# Patient Record
Sex: MALE | Race: BLACK OR AFRICAN AMERICAN | NOT HISPANIC OR LATINO | Employment: UNEMPLOYED | URBAN - METROPOLITAN AREA
[De-identification: names, ages, dates, MRNs, and addresses within clinical notes are randomized per-mention and may not be internally consistent; named-entity substitution may affect disease eponyms.]

---

## 2024-03-16 ENCOUNTER — HOSPITAL ENCOUNTER (EMERGENCY)
Facility: HOSPITAL | Age: 24
Discharge: HOME | End: 2024-03-16
Attending: STUDENT IN AN ORGANIZED HEALTH CARE EDUCATION/TRAINING PROGRAM
Payer: MEDICAID

## 2024-03-16 VITALS
SYSTOLIC BLOOD PRESSURE: 142 MMHG | DIASTOLIC BLOOD PRESSURE: 74 MMHG | HEIGHT: 68 IN | RESPIRATION RATE: 16 BRPM | WEIGHT: 285.69 LBS | BODY MASS INDEX: 43.3 KG/M2 | HEART RATE: 72 BPM | OXYGEN SATURATION: 99 % | TEMPERATURE: 99 F

## 2024-03-16 DIAGNOSIS — J45.901 EXACERBATION OF ASTHMA, UNSPECIFIED ASTHMA SEVERITY, UNSPECIFIED WHETHER PERSISTENT: Primary | ICD-10-CM

## 2024-03-16 DIAGNOSIS — J06.9 UPPER RESPIRATORY TRACT INFECTION, UNSPECIFIED TYPE: ICD-10-CM

## 2024-03-16 DIAGNOSIS — R07.9 CHEST PAIN: ICD-10-CM

## 2024-03-16 LAB
ALBUMIN SERPL-MCNC: 3.9 G/DL (ref 3.5–5)
ALBUMIN/GLOB SERPL: 1 RATIO (ref 1.1–2)
ALP SERPL-CCNC: 35 UNIT/L (ref 40–150)
ALT SERPL-CCNC: 35 UNIT/L (ref 0–55)
AST SERPL-CCNC: 28 UNIT/L (ref 5–34)
BASOPHILS # BLD AUTO: 0.04 X10(3)/MCL
BASOPHILS NFR BLD AUTO: 0.8 %
BILIRUB SERPL-MCNC: 1 MG/DL
BUN SERPL-MCNC: 10.9 MG/DL (ref 8.9–20.6)
CALCIUM SERPL-MCNC: 9 MG/DL (ref 8.4–10.2)
CHLORIDE SERPL-SCNC: 104 MMOL/L (ref 98–107)
CO2 SERPL-SCNC: 27 MMOL/L (ref 22–29)
CREAT SERPL-MCNC: 1.11 MG/DL (ref 0.73–1.18)
EOSINOPHIL # BLD AUTO: 0.09 X10(3)/MCL (ref 0–0.9)
EOSINOPHIL NFR BLD AUTO: 1.7 %
ERYTHROCYTE [DISTWIDTH] IN BLOOD BY AUTOMATED COUNT: 12.8 % (ref 11.5–17)
FLUAV AG UPPER RESP QL IA.RAPID: NOT DETECTED
FLUBV AG UPPER RESP QL IA.RAPID: NOT DETECTED
GFR SERPLBLD CREATININE-BSD FMLA CKD-EPI: >60 MLS/MIN/1.73/M2
GLOBULIN SER-MCNC: 4.1 GM/DL (ref 2.4–3.5)
GLUCOSE SERPL-MCNC: 100 MG/DL (ref 74–100)
HCT VFR BLD AUTO: 47.7 % (ref 42–52)
HGB BLD-MCNC: 16 G/DL (ref 14–18)
IMM GRANULOCYTES # BLD AUTO: 0.02 X10(3)/MCL (ref 0–0.04)
IMM GRANULOCYTES NFR BLD AUTO: 0.4 %
LYMPHOCYTES # BLD AUTO: 2.37 X10(3)/MCL (ref 0.6–4.6)
LYMPHOCYTES NFR BLD AUTO: 45.3 %
MCH RBC QN AUTO: 28.5 PG (ref 27–31)
MCHC RBC AUTO-ENTMCNC: 33.5 G/DL (ref 33–36)
MCV RBC AUTO: 85 FL (ref 80–94)
MONOCYTES # BLD AUTO: 0.6 X10(3)/MCL (ref 0.1–1.3)
MONOCYTES NFR BLD AUTO: 11.5 %
NEUTROPHILS # BLD AUTO: 2.11 X10(3)/MCL (ref 2.1–9.2)
NEUTROPHILS NFR BLD AUTO: 40.3 %
NRBC BLD AUTO-RTO: 0 %
OHS QRS DURATION: 74 MS
OHS QTC CALCULATION: 415 MS
PLATELET # BLD AUTO: 187 X10(3)/MCL (ref 130–400)
PMV BLD AUTO: 12 FL (ref 7.4–10.4)
POTASSIUM SERPL-SCNC: 4 MMOL/L (ref 3.5–5.1)
PROT SERPL-MCNC: 8 GM/DL (ref 6.4–8.3)
RBC # BLD AUTO: 5.61 X10(6)/MCL (ref 4.7–6.1)
SARS-COV-2 RNA RESP QL NAA+PROBE: NOT DETECTED
SODIUM SERPL-SCNC: 139 MMOL/L (ref 136–145)
TROPONIN I SERPL-MCNC: <0.01 NG/ML (ref 0–0.04)
WBC # SPEC AUTO: 5.23 X10(3)/MCL (ref 4.5–11.5)

## 2024-03-16 PROCEDURE — 99900035 HC TECH TIME PER 15 MIN (STAT)

## 2024-03-16 PROCEDURE — 93005 ELECTROCARDIOGRAM TRACING: CPT

## 2024-03-16 PROCEDURE — 85025 COMPLETE CBC W/AUTO DIFF WBC: CPT

## 2024-03-16 PROCEDURE — 93010 ELECTROCARDIOGRAM REPORT: CPT | Mod: ,,, | Performed by: INTERNAL MEDICINE

## 2024-03-16 PROCEDURE — 0240U COVID/FLU A&B PCR: CPT

## 2024-03-16 PROCEDURE — 80053 COMPREHEN METABOLIC PANEL: CPT

## 2024-03-16 PROCEDURE — 99285 EMERGENCY DEPT VISIT HI MDM: CPT | Mod: 25

## 2024-03-16 PROCEDURE — 84484 ASSAY OF TROPONIN QUANT: CPT

## 2024-03-16 PROCEDURE — 99900031 HC PATIENT EDUCATION (STAT)

## 2024-03-16 PROCEDURE — 25000242 PHARM REV CODE 250 ALT 637 W/ HCPCS: Performed by: PHYSICIAN ASSISTANT

## 2024-03-16 PROCEDURE — 96372 THER/PROPH/DIAG INJ SC/IM: CPT | Performed by: PHYSICIAN ASSISTANT

## 2024-03-16 PROCEDURE — 63600175 PHARM REV CODE 636 W HCPCS: Performed by: PHYSICIAN ASSISTANT

## 2024-03-16 PROCEDURE — 94640 AIRWAY INHALATION TREATMENT: CPT

## 2024-03-16 PROCEDURE — 94760 N-INVAS EAR/PLS OXIMETRY 1: CPT

## 2024-03-16 RX ORDER — ALBUTEROL SULFATE 90 UG/1
1-2 AEROSOL, METERED RESPIRATORY (INHALATION) EVERY 6 HOURS PRN
Qty: 8 G | Refills: 0 | Status: SHIPPED | OUTPATIENT
Start: 2024-03-16

## 2024-03-16 RX ORDER — IBUPROFEN 600 MG/1
600 TABLET ORAL EVERY 6 HOURS PRN
Qty: 20 TABLET | Refills: 0 | Status: SHIPPED | OUTPATIENT
Start: 2024-03-16

## 2024-03-16 RX ORDER — PROMETHAZINE HYDROCHLORIDE AND DEXTROMETHORPHAN HYDROBROMIDE 6.25; 15 MG/5ML; MG/5ML
5 SYRUP ORAL EVERY 6 HOURS PRN
Qty: 118 ML | Refills: 0 | Status: SHIPPED | OUTPATIENT
Start: 2024-03-16 | End: 2024-03-26

## 2024-03-16 RX ORDER — DEXAMETHASONE SODIUM PHOSPHATE 4 MG/ML
8 INJECTION, SOLUTION INTRA-ARTICULAR; INTRALESIONAL; INTRAMUSCULAR; INTRAVENOUS; SOFT TISSUE
Status: COMPLETED | OUTPATIENT
Start: 2024-03-16 | End: 2024-03-16

## 2024-03-16 RX ORDER — CETIRIZINE HYDROCHLORIDE 10 MG/1
10 TABLET ORAL DAILY
Qty: 30 TABLET | Refills: 0 | Status: SHIPPED | OUTPATIENT
Start: 2024-03-16 | End: 2024-04-15

## 2024-03-16 RX ORDER — IPRATROPIUM BROMIDE AND ALBUTEROL SULFATE 2.5; .5 MG/3ML; MG/3ML
3 SOLUTION RESPIRATORY (INHALATION)
Status: COMPLETED | OUTPATIENT
Start: 2024-03-16 | End: 2024-03-16

## 2024-03-16 RX ADMIN — IPRATROPIUM BROMIDE AND ALBUTEROL SULFATE 3 ML: .5; 3 SOLUTION RESPIRATORY (INHALATION) at 04:03

## 2024-03-16 RX ADMIN — DEXAMETHASONE SODIUM PHOSPHATE 8 MG: 4 INJECTION, SOLUTION INTRA-ARTICULAR; INTRALESIONAL; INTRAMUSCULAR; INTRAVENOUS; SOFT TISSUE at 04:03

## 2024-03-16 NOTE — FIRST PROVIDER EVALUATION
"Medical screening examination initiated.  I have conducted a focused provider triage encounter, findings are as follows:    Brief history of present illness:  arrived to ED due to chest pain and cough. Reports he is coughing up blood. Hx of asthma. Also reports fever at home.     Vitals:    03/16/24 1401   BP: 130/85   BP Location: Right arm   Patient Position: Sitting   Pulse: 98   Resp: 20   Temp: 98.5 °F (36.9 °C)   TempSrc: Oral   SpO2: 96%   Height: 5' 8" (1.727 m)       Pertinent physical exam:  awake, alert, has non-labored breathing, and follows commands.     Brief workup plan:  labs, EKG, imaging     Preliminary workup initiated; this workup will be continued and followed by the physician or advanced practice provider that is assigned to the patient when roomed.  "

## 2024-03-16 NOTE — DISCHARGE INSTRUCTIONS
Hydrate with plenty of water. Tylenol and ibuprofen in rotation for fever/aches. Use allergy medication daily. May use  cough syrup.  Use inhaler as needed.

## 2024-03-16 NOTE — ED PROVIDER NOTES
Encounter Date: 3/16/2024       History     Chief Complaint   Patient presents with    Chest Pain    Cough     Pt co bilat rib pain with coughing up blood x 2 days. Fevers at home. Pt taken robitussin.      23-year-old male with a history of asthma presents to ED for evaluation of cough and congestion for the past 2 days.  Reports subjective fever at home.  Reports bilateral rib and chest pain.  States he was having bloody mucus that he coughs up.  Taking Robitussin with minimal relief.  Denies any shortness of breath.    The history is provided by the patient. No  was used.     Review of patient's allergies indicates:  No Known Allergies  No past medical history on file.  No past surgical history on file.  No family history on file.     Review of Systems   Constitutional:  Positive for chills, fatigue and fever.   HENT:  Negative for congestion, ear pain, rhinorrhea and sore throat.    Respiratory:  Positive for cough. Negative for shortness of breath and wheezing.    Cardiovascular:  Positive for chest pain.   Gastrointestinal:  Negative for abdominal pain, nausea and vomiting.   Musculoskeletal:  Positive for myalgias.   Neurological:  Negative for headaches.   All other systems reviewed and are negative.      Physical Exam     Initial Vitals [03/16/24 1401]   BP Pulse Resp Temp SpO2   130/85 98 20 98.5 °F (36.9 °C) 96 %      MAP       --         Physical Exam    Nursing note and vitals reviewed.  Constitutional: He appears well-developed. He is cooperative.   HENT:   Head: Normocephalic and atraumatic.   Right Ear: Tympanic membrane and external ear normal.   Left Ear: Tympanic membrane and external ear normal.   Mouth/Throat: Uvula is midline, oropharynx is clear and moist and mucous membranes are normal. No trismus in the jaw. No uvula swelling.   Eyes: Conjunctivae are normal. Pupils are equal, round, and reactive to light.   Neck: Neck supple.   Normal range of motion.  Cardiovascular:   Normal rate, regular rhythm and normal heart sounds.           Pulmonary/Chest: No respiratory distress. He has wheezes in the right upper field and the left upper field. He has no rhonchi. He has no rales.   Abdominal: Abdomen is soft. Bowel sounds are normal. There is no abdominal tenderness.   Musculoskeletal:         General: Normal range of motion.      Cervical back: Normal range of motion and neck supple.     Neurological: He is alert and oriented to person, place, and time.   Skin: Skin is warm and dry. Capillary refill takes less than 2 seconds.   Psychiatric: He has a normal mood and affect.         ED Course   Procedures  Labs Reviewed   COMPREHENSIVE METABOLIC PANEL - Abnormal; Notable for the following components:       Result Value    Globulin 4.1 (*)     Albumin/Globulin Ratio 1.0 (*)     Alkaline Phosphatase 35 (*)     All other components within normal limits   CBC WITH DIFFERENTIAL - Abnormal; Notable for the following components:    MPV 12.0 (*)     All other components within normal limits   TROPONIN I - Normal   COVID/FLU A&B PCR - Normal    Narrative:     The Xpert Xpress SARS-CoV-2/FLU/RSV plus is a rapid, multiplexed real-time PCR test intended for the simultaneous qualitative detection and differentiation of SARS-CoV-2, Influenza A, Influenza B, and respiratory syncytial virus (RSV) viral RNA in either nasopharyngeal swab or nasal swab specimens.         CBC W/ AUTO DIFFERENTIAL    Narrative:     The following orders were created for panel order CBC auto differential.  Procedure                               Abnormality         Status                     ---------                               -----------         ------                     CBC with Differential[[id number]]       Abnormal            Final result                 Please view results for these tests on the individual orders.     EKG Readings: (Independently Interpreted)   Initial Reading: No STEMI. Rhythm: Normal Sinus Rhythm.  Heart Rate: 84. Ectopy: No Ectopy. Conduction: Normal. ST Segments: Normal ST Segments. T Waves: Normal. Clinical Impression: Normal Sinus Rhythm     ECG Results              EKG 12-lead (Final result)        Collection Time Result Time QRS Duration OHS QTC Calculation    03/16/24 13:56:25 03/16/24 16:10:52 74 415                     Final result by Interface, Lab In [facility] (03/16/24 16:11:02)                   Narrative:    Test Reason : R07.9,    Vent. Rate : 084 BPM     Atrial Rate : 084 BPM     P-R Int : 164 ms          QRS Dur : 074 ms      QT Int : 352 ms       P-R-T Axes : 047 045 003 degrees     QTc Int : 415 ms    Normal sinus rhythm  Normal ECG  No previous ECGs available  Confirmed by Sharifa Arambula MD ([id number]) on 3/16/2024 4:10:48 PM    Referred By:             Confirmed By:Sharifa Aarmbula MD                                  Imaging Results              X-Ray Chest PA And Lateral (Final result)  Result time 03/16/24 14:20:10      Final result by Joel Dick MD (03/16/24 14:20:10)                   Impression:      No acute cardiopulmonary process.      Electronically signed by: Joel Dick  Date:    03/16/2024  Time:    14:20               Narrative:    EXAMINATION:  XR CHEST PA AND LATERAL    CLINICAL HISTORY:  Chest pain, unspecified    TECHNIQUE:  Two views of the chest    COMPARISON:  07/24/2021    FINDINGS:  No focal opacification, pleural effusion, or pneumothorax.    The cardiomediastinal silhouette is within normal limits.    No acute osseous abnormality.                                       Medications   albuterol-ipratropium 2.5 mg-0.5 mg/3 mL nebulizer solution 3 mL (3 mLs Nebulization Given 3/16/24 1620)   dexAMETHasone injection 8 mg (8 mg Intramuscular Given 3/16/24 1619)     Medical Decision Making  23-year-old male with a history of asthma presents to ED for evaluation of cough and congestion for the past 2 days.  Reports subjective fever at home.  Reports bilateral rib and chest  pain.  States he was having bloody mucus that he coughs up.  Taking Robitussin with minimal relief.  Denies any shortness of breath.    Differential diagnosis includes but isn't limited to Viral syndrome, COVID, flu, strep, sinusitis, bronchitis, CAD, stemi, NSTEMI, PE      Amount and/or Complexity of Data Reviewed  Labs: ordered. Decision-making details documented in ED Course.  Radiology: ordered and independent interpretation performed. Decision-making details documented in ED Course.  ECG/medicine tests: ordered and independent interpretation performed. Decision-making details documented in ED Course.  Discussion of management or test interpretation with external provider(s): Patient afebrile and in no acute distress presents to ED for evaluation of upper respiratory like symptoms including cough and congestion.  Reports chest pain worse with coughing and moving.  States that he has wheezing at night.  Has a history of asthma but has not been on any asthma medication in the last year.  Upper wheezing noted bilaterally.  Given DuoNeb treatment and steroids.  Labs unremarkable with negative troponin.  No leukocytosis.  COVID and flu negative.  Chest x-ray without any acute findings EKG showing normal sinus rhythm at rate of 84 with no STEMI or arrhythmia.  Will discharge home with short course of symptomatic care.  Return ED precautions given.  Patient verbalizes understanding.    Risk  OTC drugs.  Prescription drug management.               ED Course as of 03/16/24 1746   Sat Mar 16, 2024   1656 WBC: 5.23 [SL]   1656 Troponin I: <0.010 [SL]   1656 Influenza A, Molecular: Not Detected [SL]   1656 Influenza B, Molecular: Not Detected [SL]   1656 SARS-CoV2 (COVID-19) Qualitative PCR: Not Detected [SL]   1656 X-Ray Chest PA And Lateral  No acute findings noted. [SL]   1657 EKG showing normal sinus rhythm at rate of 84, no STEMI or arrhythmia noted [SL]      ED Course User Index  [SL] Terri Umaña PA                            Clinical Impression:  Final diagnoses:  [R07.9] Chest pain  [J45.901] Exacerbation of asthma, unspecified asthma severity, unspecified whether persistent (Primary)  [J06.9] Upper respiratory tract infection, unspecified type          ED Disposition Condition    Discharge Stable          ED Prescriptions       Medication Sig Dispense Start Date End Date Auth. Provider    promethazine-dextromethorphan (PROMETHAZINE-DM) 6.25-15 mg/5 mL Syrp Take 5 mLs by mouth every 6 (six) hours as needed. 118 mL 3/16/2024 3/26/2024 Terri Umaña PA    albuterol (PROVENTIL/VENTOLIN HFA) 90 mcg/actuation inhaler Inhale 1-2 puffs into the lungs every 6 (six) hours as needed for Wheezing. Rescue 8 g 3/16/2024 -- Terri Umaña PA    cetirizine (ZYRTEC) 10 MG tablet Take 1 tablet (10 mg total) by mouth once daily. 30 tablet 3/16/2024 4/15/2024 Terri Umaña PA    ibuprofen (ADVIL,MOTRIN) 600 MG tablet Take 1 tablet (600 mg total) by mouth every 6 (six) hours as needed for Pain. 20 tablet 3/16/2024 -- Terri Umaña PA          Follow-up Information       Follow up With Specialties Details Why Contact Info    PCP  In 1 week As needed, If you do not have a PCP you may call [phone] to help get set up If you do not have a PCP you may call [phone] to help get set up.             Terri Umaña PA  03/16/24 6268

## 2024-09-11 ENCOUNTER — HOSPITAL ENCOUNTER (EMERGENCY)
Facility: HOSPITAL | Age: 24
Discharge: HOME | End: 2024-09-11
Attending: EMERGENCY MEDICINE
Payer: MEDICAID

## 2024-09-11 VITALS
OXYGEN SATURATION: 99 % | DIASTOLIC BLOOD PRESSURE: 103 MMHG | HEART RATE: 92 BPM | TEMPERATURE: 99 F | SYSTOLIC BLOOD PRESSURE: 151 MMHG | WEIGHT: 315 LBS | RESPIRATION RATE: 16 BRPM | HEIGHT: 67 IN | BODY MASS INDEX: 49.44 KG/M2

## 2024-09-11 DIAGNOSIS — M25.562 LEFT KNEE PAIN: ICD-10-CM

## 2024-09-11 DIAGNOSIS — K02.9 DENTAL CARIES: Primary | ICD-10-CM

## 2024-09-11 PROCEDURE — 96372 THER/PROPH/DIAG INJ SC/IM: CPT

## 2024-09-11 PROCEDURE — 63600175 PHARM REV CODE 636 W HCPCS

## 2024-09-11 PROCEDURE — 99284 EMERGENCY DEPT VISIT MOD MDM: CPT | Mod: 25

## 2024-09-11 RX ORDER — DICLOFENAC SODIUM 50 MG/1
50 TABLET, DELAYED RELEASE ORAL 3 TIMES DAILY PRN
Qty: 15 TABLET | Refills: 0 | Status: SHIPPED | OUTPATIENT
Start: 2024-09-11 | End: 2024-09-16

## 2024-09-11 RX ORDER — KETOROLAC TROMETHAMINE 30 MG/ML
60 INJECTION, SOLUTION INTRAMUSCULAR; INTRAVENOUS
Status: COMPLETED | OUTPATIENT
Start: 2024-09-11 | End: 2024-09-11

## 2024-09-11 RX ORDER — PENICILLIN V POTASSIUM 500 MG/1
500 TABLET, FILM COATED ORAL EVERY 6 HOURS
Qty: 40 TABLET | Refills: 0 | Status: SHIPPED | OUTPATIENT
Start: 2024-09-11 | End: 2024-09-21

## 2024-09-11 RX ADMIN — KETOROLAC TROMETHAMINE 60 MG: 30 INJECTION, SOLUTION INTRAMUSCULAR at 08:09

## 2024-09-12 NOTE — ED PROVIDER NOTES
Encounter Date: 9/11/2024       History     Chief Complaint   Patient presents with    Knee Pain     Pt. C/o L knee pain that began x 1 year ago, but became worse today. Pt. Denies trauma/injury. Ambulatory to triage    Jaw Pain     Pt. C/o L upper jaw pain that began earlier today. Denies injury/trauma, denies broken teeth, denies fever     See MDM    The history is provided by the patient. No  was used.     Review of patient's allergies indicates:  No Known Allergies  Past Medical History:   Diagnosis Date    Known health problems: none      Past Surgical History:   Procedure Laterality Date    none       No family history on file.  Social History     Tobacco Use    Smoking status: Never    Smokeless tobacco: Never   Substance Use Topics    Alcohol use: Not Currently    Drug use: Not Currently     Review of Systems   Constitutional:  Negative for fever.   HENT:  Positive for dental problem.    Respiratory:  Negative for cough and shortness of breath.    Cardiovascular:  Negative for chest pain.   Gastrointestinal:  Negative for abdominal pain.   Genitourinary:  Negative for difficulty urinating and dysuria.   Musculoskeletal:  Negative for gait problem.   Skin:  Negative for color change.   Neurological:  Negative for dizziness, speech difficulty and headaches.   Psychiatric/Behavioral:  Negative for hallucinations and suicidal ideas.    All other systems reviewed and are negative.      Physical Exam     Initial Vitals [09/11/24 2022]   BP Pulse Resp Temp SpO2   (!) 151/103 92 16 98.5 °F (36.9 °C) 99 %      MAP       --         Physical Exam    Nursing note and vitals reviewed.  Constitutional: He appears well-developed and well-nourished.   HENT:   Head: Normocephalic.   Left side dental caries   Eyes: EOM are normal.   Neck: Neck supple.   Normal range of motion.  Cardiovascular:  Normal rate, regular rhythm, normal heart sounds and intact distal pulses.           Pulmonary/Chest: Breath  sounds normal.   Abdominal: Abdomen is soft. Bowel sounds are normal.   Musculoskeletal:         General: Normal range of motion.      Cervical back: Normal range of motion and neck supple.      Comments: Left knee full ROM, no s/s of septic joint      Neurological: He is alert and oriented to person, place, and time. He has normal strength.   Skin: Skin is warm and dry. Capillary refill takes less than 2 seconds.   Psychiatric: He has a normal mood and affect. His behavior is normal. Judgment and thought content normal.         ED Course   Procedures  Labs Reviewed - No data to display       Imaging Results              X-Ray Knee 3 View Left (In process)                      Medications   ketorolac injection 60 mg (60 mg Intramuscular Given 9/11/24 2029)     Medical Decision Making  Historian:  Patient.  Patient is a Black or  24 y.o. male that presents with left knee pain x1 year and left dental pain x2 days. Associated symptoms nothing. Surrounding information is denies any injury. Exacerbated by nothing. Relieved by nothing. Patient treatment prior to arrival none. Risk factors include none. Other history pertaining to this complaint nothing.   Assessment:  See physical exam.  DD:  Dental caries, dental abscess, knee pain, arthritis  ED Course: History was obtained.  Physical was performed.  X-ray shows no acute findings.  Patient appears to have some dental caries.  Will put him on diclofenac and pen VK. Medical or surgical consults:  None. Social determinants that affect healthcare:  None.       Amount and/or Complexity of Data Reviewed  Radiology: ordered.     Details: No acute findings    Risk  Prescription drug management.  Risk Details: Pen VK and diclofenac                                      Clinical Impression:  Final diagnoses:  [M25.562] Left knee pain  [K02.9] Dental caries (Primary)          ED Disposition Condition    Discharge Stable          ED Prescriptions       Medication Sig  Dispense Start Date End Date Auth. Provider    diclofenac (VOLTAREN) 50 MG EC tablet Take 1 tablet (50 mg total) by mouth 3 (three) times daily as needed (pain). 15 tablet 9/11/2024 9/16/2024 Palmer Harley FNP    penicillin v potassium (VEETID) 500 MG tablet Take 1 tablet (500 mg total) by mouth every 6 (six) hours. for 10 days 40 tablet 9/11/2024 9/21/2024 Palmer Harley FNP          Follow-up Information       Follow up With Specialties Details Why Contact Info    Your Primary Care Provider  Call in 3 days ed follow up              Palmer Harley FNP  09/11/24 2100       Palmer Harley FNP  09/11/24 2108

## 2024-09-12 NOTE — FIRST PROVIDER EVALUATION
"Medical screening examination initiated.  I have conducted a focused provider triage encounter, findings are as follows:    Brief history of present illness:  24 year old male presents to the ER for evaluation of left knee pain x 1 year, reports worse pain today. Denies injury. Reports ALFREDO jaw/ dental pain x 1 day. Reports he has a history of intermittent tooth aches. Denies any known broken or damaged teeth. Denies fevers. Reports worsening pain with chewing food to right upper jaw.    Vitals:    09/11/24 2022 09/11/24 2023   BP: (!) 151/103    Pulse: 92    Resp: 16    Temp: 98.5 °F (36.9 °C)    TempSrc: Oral    SpO2: 99%    Weight:  (!) 144.1 kg (317 lb 10.9 oz)   Height: 5' 7" (1.702 m)        Pertinent physical exam:  alert, non-labored, ambulatory    Brief workup plan:  meds     Preliminary workup initiated; this workup will be continued and followed by the physician or advanced practice provider that is assigned to the patient when roomed.  "

## 2025-07-06 ENCOUNTER — HOSPITAL ENCOUNTER (EMERGENCY)
Facility: HOSPITAL | Age: 25
Discharge: HOME | End: 2025-07-06
Attending: EMERGENCY MEDICINE
Payer: MEDICAID

## 2025-07-06 VITALS
HEART RATE: 110 BPM | WEIGHT: 315 LBS | BODY MASS INDEX: 49.44 KG/M2 | TEMPERATURE: 99 F | OXYGEN SATURATION: 96 % | SYSTOLIC BLOOD PRESSURE: 122 MMHG | DIASTOLIC BLOOD PRESSURE: 77 MMHG | RESPIRATION RATE: 18 BRPM | HEIGHT: 67 IN

## 2025-07-06 DIAGNOSIS — L02.91 ABSCESS: Primary | ICD-10-CM

## 2025-07-06 LAB — POCT GLUCOSE: 87 MG/DL (ref 70–110)

## 2025-07-06 PROCEDURE — 99284 EMERGENCY DEPT VISIT MOD MDM: CPT | Mod: 25

## 2025-07-06 PROCEDURE — 10060 I&D ABSCESS SIMPLE/SINGLE: CPT

## 2025-07-06 PROCEDURE — 82962 GLUCOSE BLOOD TEST: CPT

## 2025-07-06 PROCEDURE — 63600175 PHARM REV CODE 636 W HCPCS

## 2025-07-06 RX ORDER — SULFAMETHOXAZOLE AND TRIMETHOPRIM 800; 160 MG/1; MG/1
1 TABLET ORAL 2 TIMES DAILY
Qty: 10 TABLET | Refills: 0 | Status: SHIPPED | OUTPATIENT
Start: 2025-07-06 | End: 2025-07-11

## 2025-07-06 RX ORDER — LIDOCAINE HYDROCHLORIDE 10 MG/ML
5 INJECTION, SOLUTION EPIDURAL; INFILTRATION; INTRACAUDAL; PERINEURAL
Status: COMPLETED | OUTPATIENT
Start: 2025-07-06 | End: 2025-07-06

## 2025-07-06 RX ORDER — IBUPROFEN 800 MG/1
800 TABLET, FILM COATED ORAL EVERY 6 HOURS PRN
Qty: 20 TABLET | Refills: 0 | Status: SHIPPED | OUTPATIENT
Start: 2025-07-06

## 2025-07-06 RX ADMIN — LIDOCAINE HYDROCHLORIDE 50 MG: 10 INJECTION, SOLUTION EPIDURAL; INFILTRATION; INTRACAUDAL; PERINEURAL at 08:07

## 2025-07-06 NOTE — Clinical Note
"Dav"Daja Pennington was seen and treated in our emergency department on 7/6/2025.  He may return to work on 07/08/2025.       If you have any questions or concerns, please don't hesitate to call.      Sergio Mitchell PA-C"

## 2025-07-07 NOTE — ED PROVIDER NOTES
Encounter Date: 7/6/2025       History     Chief Complaint   Patient presents with    Abscess     States abscess to left posterior side x 1 week.  States happened 3-4 months ago and was treated.       24-year-old male who presents with complaints of abscess to left mid back region.  This started roughly 1 week ago.  The area is painful.  He experienced this roughly 3 months ago, it was lanced, symptoms improved.  However, they have returned.  He denies drainage, fever, body aches, chills, chest pain, shortness of breath, abdominal pain, nausea, vomiting, diarrhea, urinary or bowel changes.     The history is provided by the patient.     Review of patient's allergies indicates:  No Known Allergies  Past Medical History:   Diagnosis Date    Known health problems: none      Past Surgical History:   Procedure Laterality Date    none       No family history on file.  Social History[1]  Review of Systems   Constitutional:  Negative for fever.   HENT:  Negative for sore throat.    Respiratory:  Negative for shortness of breath.    Cardiovascular:  Negative for chest pain.   Gastrointestinal:  Negative for nausea.   Genitourinary:  Negative for dysuria.   Musculoskeletal:  Negative for back pain.   Skin:  Positive for color change. Negative for rash.   Neurological:  Negative for weakness.   Hematological:  Does not bruise/bleed easily.       Physical Exam     Initial Vitals [07/06/25 1936]   BP Pulse Resp Temp SpO2   122/77 110 18 98.5 °F (36.9 °C) 96 %      MAP       --         Physical Exam    Nursing note and vitals reviewed.  Constitutional: He appears well-developed and well-nourished. He is not diaphoretic. No distress.   HENT:   Head: Normocephalic and atraumatic.   Right Ear: Hearing and external ear normal.   Left Ear: Hearing and external ear normal.   Nose: Nose normal. Mouth/Throat: Uvula is midline. No trismus in the jaw. Normal dentition. No dental abscesses or uvula swelling. No oropharyngeal exudate or  "tonsillar abscesses.   Eyes: Conjunctivae and EOM are normal. Pupils are equal, round, and reactive to light.   Neck: Neck supple.   Normal range of motion.  Cardiovascular:  Normal rate.           Pulmonary/Chest: Breath sounds normal. No respiratory distress. He has no wheezes.   Abdominal: Abdomen is soft. He exhibits no distension. There is no abdominal tenderness.   Musculoskeletal:         General: Normal range of motion.      Cervical back: Normal range of motion and neck supple.     Neurological: He is alert and oriented to person, place, and time. He has normal strength. No cranial nerve deficit or sensory deficit. GCS score is 15. GCS eye subscore is 4. GCS verbal subscore is 5. GCS motor subscore is 6.   Skin: Skin is warm. Capillary refill takes less than 2 seconds. Abscess noted. No rash noted. No erythema.        Psychiatric: He has a normal mood and affect. His behavior is normal. Thought content normal.         ED Course   I & D - Incision and Drainage    Date/Time: 2025 10:26 PM  Location procedure was performed: [facility] EMERGENCY DEPARTMENT    Performed by: Sergio Mitchell PA-C  Authorized by: Gerhard Jay MD  Consent Done: Yes  Consent: Verbal consent obtained  Risks and benefits: risks, benefits and alternatives were discussed  Consent given by: patient  Patient identity confirmed: , name and verbally with patient  Time out: Immediately prior to procedure a "time out" was called to verify the correct patient, procedure, equipment, support staff and site/side marked as required.  Type: abscess  Body area: trunk  Location details: back  Anesthesia: local infiltration    Anesthesia:  Local Anesthetic: lidocaine 1% without epinephrine  Anesthetic total: 2 mL  Risk factor: underlying major vessel, underlying major nerve and coagulopathy  Scalpel size: 11  Incision type: single straight  Complexity: simple  Drainage: pus, bloody and purulent  Drainage amount: copious  Wound treatment: " incision, deloculation, drainage, expression of material and wound packed  Packing material: 1/4 in iodoform gauze  Complications: No  Patient tolerance: Patient tolerated the procedure well with no immediate complications        Labs Reviewed   POCT GLUCOSE       Result Value    POCT Glucose 87            Imaging Results    None          Medications   LIDOcaine (PF) 10 mg/ml (1%) injection 50 mg (50 mg Infiltration Given 7/6/25 2022)     Medical Decision Making  Differential diagnosis:   Abscess   Cellulitis    Amount and/or Complexity of Data Reviewed  Labs: ordered.    Risk  Prescription drug management.               ED Course as of 07/07/25 0138   Machelle Jul 06, 2025 2120 Discussed with patient to return in 48 hours for wound recheck.  Patient verbalized understanding. [DB]   2127 Given strict ED return precautions. I have spoken with the patient and/or caregivers. I have explained the patient's condition, diagnoses and treatment plan based on the information available to me at this time. I have answered the patient's and/or caregiver's questions and addressed any concerns. The patient and/or caregivers have as good an understanding of the patient's diagnosis, condition and treatment plan as can be expected at this point. The vital signs have been stable. The patient's condition is stable and appropriate for discharge from the emergency department.      The patient will pursue further outpatient evaluation with the primary care physician or other designated or consulting physician as outlined in the discharge instructions. The patient and/or caregivers are agreeable to this plan of care and follow-up instructions have been explained in detail. The patient and/or caregivers have received these instructions in written format and have expressed an understanding of the discharge instructions. The patient and/or caregivers are aware that any significant change in condition or worsening of symptoms should prompt an  immediate return to this or the closest emergency department or a call to 1.    [DB]      ED Course User Index  [DB] Sergio Mitchell PA-C                           Clinical Impression:  Final diagnoses:  [L02.91] Abscess (Primary)          ED Disposition Condition    Discharge Stable          ED Prescriptions       Medication Sig Dispense Start Date End Date Auth. Provider    sulfamethoxazole-trimethoprim 800-160mg (BACTRIM DS) 800-160 mg Tab Take 1 tablet by mouth 2 (two) times daily. for 5 days 10 tablet 7/6/2025 7/11/2025 Sergio Mitchell PA-C    ibuprofen (ADVIL,MOTRIN) 800 MG tablet Take 1 tablet (800 mg total) by mouth every 6 (six) hours as needed for Pain. 20 tablet 7/6/2025 -- Sergio Mitchell PA-C          Follow-up Information       Follow up With Specialties Details Why Contact Info    [facility] - Emergency Dept Emergency Medicine  If symptoms worsen [address] [facility] [address] [phone]                   [1]   Social History  Tobacco Use    Smoking status: Never    Smokeless tobacco: Never   Vaping Use    Vaping status: Never Used   Substance Use Topics    Alcohol use: Not Currently    Drug use: Not Currently        Sergio Mitchell PA-C  07/07/25 0139

## 2025-07-07 NOTE — DISCHARGE INSTRUCTIONS
As discussed, please follow up with General surgery.  A referral has been provided for you today.      As discussed, please return to ED or present to your family doctor in 48 hours for wound recheck.

## 2025-07-08 ENCOUNTER — HOSPITAL ENCOUNTER (EMERGENCY)
Facility: HOSPITAL | Age: 25
Discharge: HOME | End: 2025-07-08
Attending: FAMILY MEDICINE
Payer: MEDICAID

## 2025-07-08 VITALS
SYSTOLIC BLOOD PRESSURE: 148 MMHG | WEIGHT: 315 LBS | HEART RATE: 88 BPM | RESPIRATION RATE: 18 BRPM | TEMPERATURE: 98 F | BODY MASS INDEX: 55.29 KG/M2 | DIASTOLIC BLOOD PRESSURE: 100 MMHG | OXYGEN SATURATION: 98 %

## 2025-07-08 DIAGNOSIS — Z09 ENCOUNTER FOR RECHECK OF ABSCESS FOLLOWING INCISION AND DRAINAGE: ICD-10-CM

## 2025-07-08 DIAGNOSIS — Z48.00 ABSCESS PACKING REMOVAL: Primary | ICD-10-CM

## 2025-07-08 PROCEDURE — 99281 EMR DPT VST MAYX REQ PHY/QHP: CPT

## 2025-07-08 NOTE — ED PROVIDER NOTES
Encounter Date: 7/8/2025       History     Chief Complaint   Patient presents with    Wound Check     FU I&D OF ABSCESS TO LT BACK.  REPORTS MUCH IMPROVED, TAKING RX MEDS.      24-year-old male with a history of left back abscess that was I and D he had here 2 days ago presents for recheck.  Denies any fever, chills, body aches, increased pain, increased swelling, headache, nausea, vomiting, or any other new or worsening concerns.   is taking his antibiotic as prescribed and has been applying warm compresses as instructed.  Additionally \[facility]\"" has already scheduled surgical follow-up for August 5th.    The history is provided by the patient. No  was used.     Review of patient's allergies indicates:  No Known Allergies  Past Medical History:   Diagnosis Date    Known health problems: none      Past Surgical History:   Procedure Laterality Date    none       No family history on file.  Social History[1]  Review of Systems   All other systems reviewed and are negative.      Physical Exam     Initial Vitals [07/08/25 0856]   BP Pulse Resp Temp SpO2   (!) 148/100 88 18 97.5 °F (36.4 °C) 98 %      MAP       --         Physical Exam    Nursing note and vitals reviewed.  Constitutional: He is Obese . He is active and cooperative. He does not have a sickly appearance. He does not appear ill.   HENT:   Head: Normocephalic and atraumatic.   Right Ear: External ear normal.   Left Ear: External ear normal. Mouth/Throat: Oropharynx is clear and moist.   Eyes: Conjunctivae and EOM are normal. Pupils are equal, round, and reactive to light.   Neck: Neck supple.   Normal range of motion.  Cardiovascular:  Normal rate, regular rhythm, normal heart sounds and intact distal pulses.     Exam reveals no gallop and no friction rub.       No murmur heard.  Pulmonary/Chest: Breath sounds normal.   Abdominal: Abdomen is soft. Bowel sounds are normal.   Musculoskeletal:         General: Normal range of motion.       Cervical back: Normal range of motion and neck supple.     Neurological: He is alert and oriented to person, place, and time. He has normal strength. GCS score is 15. GCS eye subscore is 4. GCS verbal subscore is 5. GCS motor subscore is 6.   Skin: Skin is warm and dry. Capillary refill takes less than 2 seconds. Abscess noted.   Left posterior mid-clavicular med torso indurated area measuring approximately 4 in by 2 in with a 1 cm surgical incision with quarter-inch iodoform gauze in place noted.   Psychiatric: He has a normal mood and affect. His behavior is normal. Thought content normal.         ED Course   Procedures  Labs Reviewed - No data to display       Imaging Results    None          Medications - No data to display  Medical Decision Making  24-year-old male with left posterior torso abscess that was I and D'd 2 days ago here for follow-up.  Differentials:  Abscess follow-up  Cellulitis                  ED Course as of 07/08/25 0941   Tue Jul 08, 2025   0940 Given strict ED return precautions. I have spoken with the patient and/or caregivers. I have explained the patient's condition, diagnoses and treatment plan based on the information available to me at this time. I have answered the patient's and/or caregiver's questions and addressed any concerns. The patient and/or caregivers have as good an understanding of the patient's diagnosis, condition and treatment plan as can be expected at this point. The vital signs have been stable. The patient's condition is stable and appropriate for discharge from the emergency department.      The patient will pursue further outpatient evaluation with the primary care physician or other designated or consulting physician as outlined in the discharge instructions. The patient and/or caregivers are agreeable to this plan of care and follow-up instructions have been explained in detail. The patient and/or caregivers have received these instructions in written format and  have expressed an understanding of the discharge instructions. The patient and/or caregivers are aware that any significant change in condition or worsening of symptoms should prompt an immediate return to this or the closest emergency department or a call to 911.  [FS]      ED Course User Index  [FS] Monisha Franklin FNP                           Clinical Impression:  Final diagnoses:  [Z48.00] Abscess packing removal (Primary)  [Z09] Encounter for recheck of abscess following incision and drainage          ED Disposition Condition    Discharge Stable          ED Prescriptions    None       Follow-up Information       Follow up With Specialties Details Why Contact Info    [facility] Emergency Medicine  If symptoms worsen [address] [facility] [place] [address] [phone]        Follow-up with surgery as scheduled on August 5, 2025                   [1]   Social History  Tobacco Use    Smoking status: Never    Smokeless tobacco: Never   Vaping Use    Vaping status: Never Used   Substance Use Topics    Alcohol use: Not Currently    Drug use: Not Currently        Monisha Franklin FNP  07/08/25 0941